# Patient Record
Sex: MALE | Race: WHITE | NOT HISPANIC OR LATINO | Employment: OTHER | URBAN - NONMETROPOLITAN AREA
[De-identification: names, ages, dates, MRNs, and addresses within clinical notes are randomized per-mention and may not be internally consistent; named-entity substitution may affect disease eponyms.]

---

## 2017-04-19 ENCOUNTER — OFFICE VISIT - GICH (OUTPATIENT)
Dept: FAMILY MEDICINE | Facility: OTHER | Age: 37
End: 2017-04-19

## 2017-04-19 DIAGNOSIS — Z00.00 ENCOUNTER FOR GENERAL ADULT MEDICAL EXAMINATION WITHOUT ABNORMAL FINDINGS: ICD-10-CM

## 2017-04-19 LAB
AMORPHOUS - HISTORICAL: PRESENT
BACTERIA URINE: ABNORMAL BACTERIA/HPF
BILIRUB UR QL: NEGATIVE
CLARITY, URINE: ABNORMAL CLARITY
COLOR UR: YELLOW COLOR
EPITHELIAL CELLS: ABNORMAL EPI/HPF
GLUCOSE URINE: NEGATIVE MG/DL
KETONES UR QL: NEGATIVE MG/DL
LEUKOCYTE ESTERASE URINE: NEGATIVE
NITRITE UR QL STRIP: NEGATIVE
OCCULT BLOOD,URINE - HISTORICAL: NEGATIVE
PH UR: 8.5 [PH]
PROTEIN QUALITATIVE,URINE - HISTORICAL: NEGATIVE MG/DL
RBC - HISTORICAL: ABNORMAL /HPF
SP GR UR STRIP: 1.02
UROBILINOGEN,QUALITATIVE - HISTORICAL: NORMAL EU/DL
WBC - HISTORICAL: ABNORMAL /HPF

## 2017-04-24 ENCOUNTER — OFFICE VISIT - GICH (OUTPATIENT)
Dept: FAMILY MEDICINE | Facility: OTHER | Age: 37
End: 2017-04-24

## 2017-04-24 ENCOUNTER — HISTORY (OUTPATIENT)
Dept: FAMILY MEDICINE | Facility: OTHER | Age: 37
End: 2017-04-24

## 2017-04-24 DIAGNOSIS — Z00.00 ENCOUNTER FOR GENERAL ADULT MEDICAL EXAMINATION WITHOUT ABNORMAL FINDINGS: ICD-10-CM

## 2017-04-24 DIAGNOSIS — F41.1 GENERALIZED ANXIETY DISORDER: ICD-10-CM

## 2017-04-24 LAB
A/G RATIO - HISTORICAL: 2.1 (ref 1–2)
ALBUMIN SERPL-MCNC: 4.5 G/DL (ref 3.5–5.7)
ALP SERPL-CCNC: 49 IU/L (ref 34–104)
ALT (SGPT) - HISTORICAL: 12 IU/L (ref 7–52)
ANION GAP - HISTORICAL: 6 (ref 5–18)
AST SERPL-CCNC: 15 IU/L (ref 13–39)
BILIRUB SERPL-MCNC: 0.5 MG/DL (ref 0.3–1)
BUN SERPL-MCNC: 12 MG/DL (ref 7–25)
BUN/CREAT RATIO - HISTORICAL: 15
CALCIUM SERPL-MCNC: 9.5 MG/DL (ref 8.6–10.3)
CHLORIDE SERPLBLD-SCNC: 104 MMOL/L (ref 98–107)
CHOL/HDL RATIO - HISTORICAL: 4.61
CHOLESTEROL TOTAL: 189 MG/DL
CO2 SERPL-SCNC: 27 MMOL/L (ref 21–31)
CREAT SERPL-MCNC: 0.81 MG/DL (ref 0.7–1.3)
GFR IF NOT AFRICAN AMERICAN - HISTORICAL: >60 ML/MIN/1.73M2
GLOBULIN - HISTORICAL: 2.1 G/DL (ref 2–3.7)
GLUCOSE SERPL-MCNC: 75 MG/DL (ref 70–105)
HDLC SERPL-MCNC: 41 MG/DL (ref 23–92)
LDLC SERPL CALC-MCNC: 106 MG/DL
NON-HDL CHOLESTEROL - HISTORICAL: 148 MG/DL
PATIENT STATUS - HISTORICAL: ABNORMAL
POTASSIUM SERPL-SCNC: 3.7 MMOL/L (ref 3.5–5.1)
PROT SERPL-MCNC: 6.6 G/DL (ref 6.4–8.9)
SODIUM SERPL-SCNC: 137 MMOL/L (ref 133–143)
TRIGL SERPL-MCNC: 212 MG/DL

## 2018-01-04 NOTE — NURSING NOTE
Patient Information     Patient Name MRN Sex Grace Hinds [id number] Male 1980      Nursing Note by Mattie Ambriz at 2017  1:30 PM     Author:  Mattie Ambriz Service:  (none) Author Type:  (none)     Filed:  2017  1:54 PM Encounter Date:  2017 Status:  Signed     :  Mattie Ambriz            Patient presents today for a regular physical.  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  Mattie Ambriz LPN  2017  1:41 PM

## 2018-01-04 NOTE — PROGRESS NOTES
Patient Information     Patient Name MRN Sex     Grace Ku [id number] Male 1980      Progress Notes by Abdullahi Wesley MD at 2017  1:30 PM     Author:  Abdullahi Wesley MD Service:  (none) Author Type:  Physician     Filed:  2017  2:19 PM Encounter Date:  2017 Status:  Signed     :  Abdullahi Wesley MD (Physician)            SUBJECTIVE:    Grace Ku is a 36 y.o. male who presents for a general physical exam.    HPI: Patient overall has been feeling well. He has a history of anxiety with panic and plans to establish with a provider from [facility] to manage his medications. He otherwise has no concerns today.    PROBLEM LIST:  There is no problem list on file for this patient.    PAST MEDICAL HISTORY:No past medical history on file.  SURGICAL HISTORY:  Past Surgical History:      Procedure  Laterality Date     WISDOM TEETH EXTRACTION  ~       SOCIAL HISTORY:  Social History     Social History        Marital status:  Single     Spouse name: N/A     Number of children:  N/A     Years of education:  N/A     Occupational History      Not on file.     Social History Main Topics       Smoking status: Former Smoker     Smokeless tobacco: Never Used     Alcohol use No     Drug use: No     Sexual activity: Not on file     Other Topics  Concern     Seat Belt Yes     Social History Narrative      in . No children. Does seasonal landscaping.     FAMILY HISTORY:  Family History      Problem  Relation Age of Onset     Good Health Mother      Good Health Father      Good Health Brother       CURRENT MEDICATIONS:   Current Outpatient Prescriptions       Medication  Sig Dispense Refill     LORazepam (ATIVAN) 0.5 mg tab Take 1 tablet by mouth 2 times daily if needed.       traZODone (DESYREL) 50 mg tablet Take 1 tablet by mouth at bedtime if needed.       venlafaxine (EFFEXOR XR) 75 mg cp24 Extended-Release capsule Take 2 capsules by mouth once daily with evening  "meal.       No current facility-administered medications for this visit.      Medications have been reviewed by me and are current to the best of my knowledge and ability.    ALLERGIES:  Mice [mouse protein]    REVIEW OF SYSTEMS:  General: denies any general problems.  Eyes: denies problems  Ears/Nose/Throat: denies problems  Cardiovascular: denies problems  Respiratory: denies problems  Gastrointestinal: denies problems  Genitourinary: denies problems  Musculoskeletal: denies problems  Skin: denies problems  Neurologic: denies problems  Psychiatric: denies problems  Endocrine: denies problems  Heme/Lymphatic: denies problems  Allergic/Immunologic: denies problems  PHQ Depression Screening 4/24/2017   Date of PHQ exam (doc flow) 4/24/2017   1. Lack of interest/pleasure 0 - Not at all   2. Feeling down/depressed 0 - Not at all   PHQ-2 TOTAL SCORE 0      OBJECTIVE:  /76  Pulse 92  Ht 1.765 m (5' 9.5\")  Wt 67.3 kg (148 lb 6 oz)  BMI 21.6 kg/m2  EXAM:   General Appearance: Pleasant, alert, appropriate appearance for age. No acute distress  Head Exam: Normal. Normocephalic, atraumatic.  Eye Exam:  Normal external eye, conjunctiva, lids, cornea. JOANNA.  Ear Exam: Normal TM's bilaterally. Normal auditory canals and external ears. Non-tender.  Nose Exam: Normal external nose, mucus membranes, and septum.  OroPharynx Exam:  Dental hygiene adequate. Normal buccal mucosa. Normal pharynx.  Neck Exam:  Supple, no masses or nodes.  Thyroid Exam: No nodules or enlargement.  Chest/Respiratory Exam: Normal chest wall and respirations. Clear to auscultation.  Cardiovascular Exam: Regular rate and rhythm. S1, S2, no murmur, click, gallop, or rubs.  Gastrointestinal Exam: Soft, non-tender, no masses or organomegaly.  Genitourinary Exam Male: Normal male genitalia. No discharge or penile ulcerations. No testicular masses or swelling.  Lymphatic Exam: Non-palpable nodes in neck, clavicular, axillary, or inguinal " regions.  Musculoskeletal Exam: Back is straight and non-tender, full ROM of upper and lower extremities.  Foot Exam: Normal.  Skin: no rash or abnormalities  Neurologic Exam: Nonfocal, symmetric DTRs, normal gross motor, tone coordination and no tremor.  Psychiatric Exam: Alert and oriented - appropriate affect.    ASSESSMENT/PLAN:    ICD-10-CM    1. Health care maintenance  Immunizations reviewed. He will be due for a tetanus booster next year.  Z00.00 COMPLETE METABOLIC PANEL      LIPID PANEL      COMPLETE METABOLIC PANEL      LIPID PANEL   2. Generalized anxiety disorder  He plans to establish with [facility] for medication management in this regard.  F41.1        Abdullahi Wesley MD

## 2018-01-04 NOTE — NURSING NOTE
Patient Information     Patient Name MRN Sex Grcae Hinds [id number] Male 1980      Nursing Note by Ashli Santos at 2017  2:45 PM     Author:  Ashli Santos Service:  (none) Author Type:  (none)     Filed:  2017  3:13 PM Encounter Date:  2017 Status:  Signed     :  Ashli Santos            Patient here for DOT physical, Visual Acuity Screening - Snellen Chart   Visual acuity OD (right eye): 20/ 20   Visual acuity OS (left eye): 20/ 20   Visual acuity OU (both eyes): 20/ 20   Corrective lenses worn: No  Color screening passed. .sing

## 2018-01-04 NOTE — PROGRESS NOTES
"Patient Information     Patient Name MRN Sex     Grace Ku [id number] Male 1980      Progress Notes by Tate Subramanian MD at 2017  2:45 PM     Author:  Tate Subramanian MD Service:  (none) Author Type:  Physician     Filed:  2017 10:57 AM Encounter Date:  2017 Status:  Signed     :  Tate Subramanian MD (Physician)            SUBJECTIVE:    Grace Ku is a 36 y.o. male who presents for DOT    HPI Comments: Patient arrives here for a DOT physical. Currently does not offer any complaints or concerns.      Allergies     Allergen  Reactions     Mice [Mouse Protein] Hives   , No family history on file.,   No current outpatient prescriptions on file prior to visit.     No current facility-administered medications on file prior to visit.    , No past surgical history on file.,   Social History     Substance Use Topics       Smoking status: Not on file     Smokeless tobacco: Not on file     Alcohol use Not on file    and   Social History     Substance Use Topics       Smoking status: Not on file     Smokeless tobacco: Not on file     Alcohol use Not on file       REVIEW OF SYSTEMS:  ROS    OBJECTIVE:  /74  Pulse 68  Ht 1.759 m (5' 9.25\")  Wt 67 kg (147 lb 12.8 oz)  BMI 21.67 kg/m2    EXAM:   Physical Exam   Constitutional: He is oriented to person, place, and time and well-developed, well-nourished, and in no distress.   HENT:   Head: Normocephalic.   Right Ear: External ear normal.   Left Ear: External ear normal.   Mouth/Throat: No oropharyngeal exudate.   Eyes: Pupils are equal, round, and reactive to light.   Neck: Normal range of motion.   Cardiovascular: Normal rate, regular rhythm and normal heart sounds.    Pulmonary/Chest: Effort normal and breath sounds normal. No respiratory distress. He has no wheezes. He has no rales.   Abdominal: Soft.   Musculoskeletal: Normal range of motion.   Neurological: He is alert and oriented to person, place, and time. "   Psychiatric: Affect normal.     Results for orders placed or performed in visit on 04/19/17       URINALYSIS W REFLEX MICROSCOPIC IF POSITIVE       Result  Value Ref Range Status    COLOR                     Yellow Yellow Color Final    CLARITY                   Slightly Cloudy (A) Clear Clarity Final    SPECIFIC GRAVITY,URINE    1.020 1.010, 1.015, 1.020, 1.025                 Final    PH,URINE                  8.5 6.0, 7.0, 8.0, 5.5, 6.5, 7.5, 8.5                 Final    UROBILINOGEN,QUALITATIVE  Normal Normal EU/dl Final    PROTEIN, URINE Negative Negative mg/dL Final    GLUCOSE, URINE Negative Negative mg/dL Final    KETONES,URINE             Negative Negative mg/dL Final    BILIRUBIN,URINE           Negative Negative                 Final    OCCULT BLOOD,URINE        Negative Negative                 Final    NITRITE                   Negative Negative                 Final    LEUKOCYTE ESTERASE        Negative Negative                 Final   URINALYSIS MICROSCOPIC       Result  Value Ref Range Status    RBC None Seen 0-2, None Seen /HPF Final    WBC None Seen 0-2, 3-5, None Seen /HPF Final    BACTERIA                  Few None Seen, Rare, Occasional, Few Bacteria/HPF Final    EPITHELIAL CELLS          None Seen None Seen, Few Epi/HPF Final    AMORPHOUS                 Present (A) (none)                 Final       ASSESSMENT/PLAN:    ICD-10-CM    1. Preventative health care Z00.00 URINALYSIS W REFLEX MICROSCOPIC IF POSITIVE      URINALYSIS W REFLEX MICROSCOPIC IF POSITIVE      URINALYSIS MICROSCOPIC      URINALYSIS MICROSCOPIC        Plan:  Satisfactory DOT. Patient is given a 2 year certificate.

## 2018-01-25 VITALS
WEIGHT: 148.38 LBS | HEIGHT: 70 IN | DIASTOLIC BLOOD PRESSURE: 76 MMHG | SYSTOLIC BLOOD PRESSURE: 132 MMHG | BODY MASS INDEX: 21.24 KG/M2 | HEART RATE: 92 BPM

## 2018-01-25 VITALS
BODY MASS INDEX: 21.89 KG/M2 | HEIGHT: 69 IN | DIASTOLIC BLOOD PRESSURE: 74 MMHG | SYSTOLIC BLOOD PRESSURE: 132 MMHG | WEIGHT: 147.8 LBS | HEART RATE: 68 BPM

## 2018-01-31 ENCOUNTER — DOCUMENTATION ONLY (OUTPATIENT)
Dept: FAMILY MEDICINE | Facility: OTHER | Age: 38
End: 2018-01-31

## 2018-01-31 PROBLEM — F41.1 GENERALIZED ANXIETY DISORDER: Status: ACTIVE | Noted: 2017-04-24

## 2018-01-31 RX ORDER — LORAZEPAM 0.5 MG/1
1 TABLET ORAL 2 TIMES DAILY PRN
COMMUNITY
Start: 2017-03-02 | End: 2019-01-30

## 2018-01-31 RX ORDER — VENLAFAXINE HYDROCHLORIDE 75 MG/1
2 CAPSULE, EXTENDED RELEASE ORAL
COMMUNITY
Start: 2017-03-02 | End: 2019-01-30

## 2018-01-31 RX ORDER — TRAZODONE HYDROCHLORIDE 50 MG/1
1 TABLET, FILM COATED ORAL
COMMUNITY
Start: 2017-03-02 | End: 2019-01-30

## 2019-01-30 ENCOUNTER — OFFICE VISIT (OUTPATIENT)
Dept: FAMILY MEDICINE | Facility: OTHER | Age: 39
End: 2019-01-30
Attending: FAMILY MEDICINE
Payer: COMMERCIAL

## 2019-01-30 VITALS
BODY MASS INDEX: 21.72 KG/M2 | WEIGHT: 149.2 LBS | TEMPERATURE: 98.4 F | SYSTOLIC BLOOD PRESSURE: 128 MMHG | HEART RATE: 80 BPM | DIASTOLIC BLOOD PRESSURE: 78 MMHG | RESPIRATION RATE: 12 BRPM

## 2019-01-30 DIAGNOSIS — K52.9 GASTROENTERITIS: Primary | ICD-10-CM

## 2019-01-30 PROCEDURE — G0463 HOSPITAL OUTPT CLINIC VISIT: HCPCS

## 2019-01-30 PROCEDURE — 99213 OFFICE O/P EST LOW 20 MIN: CPT | Performed by: FAMILY MEDICINE

## 2019-01-30 ASSESSMENT — ANXIETY QUESTIONNAIRES
7. FEELING AFRAID AS IF SOMETHING AWFUL MIGHT HAPPEN: NOT AT ALL
1. FEELING NERVOUS, ANXIOUS, OR ON EDGE: NOT AT ALL
5. BEING SO RESTLESS THAT IT IS HARD TO SIT STILL: NOT AT ALL
GAD7 TOTAL SCORE: 0
3. WORRYING TOO MUCH ABOUT DIFFERENT THINGS: NOT AT ALL
6. BECOMING EASILY ANNOYED OR IRRITABLE: NOT AT ALL
2. NOT BEING ABLE TO STOP OR CONTROL WORRYING: NOT AT ALL

## 2019-01-30 ASSESSMENT — PATIENT HEALTH QUESTIONNAIRE - PHQ9
SUM OF ALL RESPONSES TO PHQ QUESTIONS 1-9: 0
5. POOR APPETITE OR OVEREATING: NOT AT ALL

## 2019-01-30 ASSESSMENT — PAIN SCALES - GENERAL: PAINLEVEL: NO PAIN (0)

## 2019-01-30 NOTE — NURSING NOTE
Patient here abdominal discomfort with diarrhea for the past 3 weeks. Possible tapeworm, has noticed a noodle like object in his stool. Medication Reconciliation: complete.    Ashli Santos LPN  1/30/2019 8:15 AM

## 2019-01-31 ASSESSMENT — ANXIETY QUESTIONNAIRES: GAD7 TOTAL SCORE: 0

## 2019-01-31 NOTE — PROGRESS NOTES
"  SUBJECTIVE:   Grace Ku is a 38 year old male who presents to clinic today for the following health issues: Tapeworm concern    Patient arrives here concerned about a tapeworm.  States he eats quite a bit of fish and seafood that is not not collected but marinated in [employer].  States recently he had noodles.  Shortly after having noodles he had diarrhea and noticed a possible worm present.  He has had rostrum.  [place].  Denies any weight loss no bloating.  Occurred about 3 weeks ago.  He noticed the \"worm\" only once          Patient Active Problem List    Diagnosis Date Noted     Generalized anxiety disorder 04/24/2017     Priority: Medium     No past medical history on file.   Past Surgical History:   Procedure Laterality Date     EXTRACTION(S) DENTAL      ~1997     Allergies   Allergen Reactions     Mouse Protein Hives       Review of Systems     OBJECTIVE:     /78 (BP Location: Right arm, Patient Position: Sitting)   Pulse 80   Temp 98.4  F (36.9  C)   Resp 12   Wt 67.7 kg (149 lb 3.2 oz)   BMI 21.72 kg/m    Body mass index is 21.72 kg/m .  Physical Exam   Constitutional: He appears well-developed.   HENT:   Head: Normocephalic.   Abdominal: Soft. Bowel sounds are normal. He exhibits no distension and no mass. There is no tenderness.       none     ASSESSMENT/PLAN:         1. Gastroenteritis  I think it is remotely possible that the patient does have a warm.  It sounds like that this might be more of a noodle because of his gastroenteritis.  He is given cups to bring in a sample if he should notice a \"worm\".        Tate Subramanian MD  [facility]  "

## 2019-08-13 LAB
O+P STL MICRO: ABNORMAL
O+P STL MICRO: ABNORMAL
SPECIMEN SOURCE: ABNORMAL

## 2019-08-16 ENCOUNTER — OFFICE VISIT (OUTPATIENT)
Dept: FAMILY MEDICINE | Facility: OTHER | Age: 39
End: 2019-08-16
Attending: FAMILY MEDICINE
Payer: COMMERCIAL

## 2019-08-16 ENCOUNTER — TELEPHONE (OUTPATIENT)
Dept: FAMILY MEDICINE | Facility: OTHER | Age: 39
End: 2019-08-16

## 2019-08-16 VITALS
TEMPERATURE: 98.9 F | BODY MASS INDEX: 21.39 KG/M2 | RESPIRATION RATE: 16 BRPM | WEIGHT: 149.4 LBS | SYSTOLIC BLOOD PRESSURE: 120 MMHG | DIASTOLIC BLOOD PRESSURE: 64 MMHG | HEIGHT: 70 IN | HEART RATE: 68 BPM

## 2019-08-16 DIAGNOSIS — B71.9 TAPEWORM INFECTION: Primary | ICD-10-CM

## 2019-08-16 DIAGNOSIS — K52.9 GASTROENTERITIS: ICD-10-CM

## 2019-08-16 PROCEDURE — 99213 OFFICE O/P EST LOW 20 MIN: CPT | Performed by: FAMILY MEDICINE

## 2019-08-16 PROCEDURE — G0463 HOSPITAL OUTPT CLINIC VISIT: HCPCS

## 2019-08-16 RX ORDER — PRAZIQUANTEL 600 MG/1
TABLET, FILM COATED ORAL
Qty: 2 TABLET | Refills: 0 | Status: SHIPPED | OUTPATIENT
Start: 2019-08-16 | End: 2020-07-14

## 2019-08-16 ASSESSMENT — PAIN SCALES - GENERAL: PAINLEVEL: NO PAIN (0)

## 2019-08-16 ASSESSMENT — PATIENT HEALTH QUESTIONNAIRE - PHQ9: SUM OF ALL RESPONSES TO PHQ QUESTIONS 1-9: 0

## 2019-08-16 ASSESSMENT — MIFFLIN-ST. JEOR: SCORE: 1603.92

## 2019-08-16 NOTE — TELEPHONE ENCOUNTER
Prescription not covered unable to pay cash because patient is on [employer]. Prior authorization needs to be done or change what is on Formulary pharmacist had no idea what is on formulary. Will check with CLAUDIA as MBL is out this afternoon. Ashli Santos LPN .......................8/16/2019  2:02 PM

## 2019-08-16 NOTE — PROGRESS NOTES
"  SUBJECTIVE:   Grace Ku is a 38 year old male who presents to clinic today for the following health issues: Follow-up    Patient was seen back in the early part of the year concerns about a tapeworm.  He was given sample cups to bring in if he is concerned about another tapeworm.  He did bring in a couple days ago.  And was found to have tapeworm eggs.  He arrives here for follow-up.  Further history he does use Lyme to clean raw fish.  And he will eat them.  He remembers one specific incident where the line may be did not completely immerse the fish.        Patient Active Problem List    Diagnosis Date Noted     Tapeworm infection 08/16/2019     Priority: Medium     Generalized anxiety disorder 04/24/2017     Priority: Medium     No past medical history on file.   Past Surgical History:   Procedure Laterality Date     EXTRACTION(S) DENTAL      ~1997     Family History   Problem Relation Age of Onset     Family History Negative Mother         Good Health     Family History Negative Father         Good Health     Family History Negative Brother         Good Health       Review of Systems     OBJECTIVE:     /64   Pulse 68   Temp 98.9  F (37.2  C)   Resp 16   Ht 1.778 m (5' 10\")   Wt 67.8 kg (149 lb 6.4 oz)   BMI 21.44 kg/m    Body mass index is 21.44 kg/m .  Physical Exam   Constitutional: He appears well-developed.   Eyes: Pupils are equal, round, and reactive to light.   Neurological: He is alert.   Skin: Skin is warm.       Diagnostic Test Results:  Results for orders placed or performed in visit on 08/16/19   Ova and Parasite Exam Routine   Result Value Ref Range    Specimen Description Feces     Ova and Parasite Exam Diphyllobothrium latum eggs (A)     Ova and Parasite Exam       Cryptosporidium, Cyclospora, and Microsporidia are not readily detected by this method. A   single negative specimen does not rule out parasitic infection.         ASSESSMENT/PLAN:           2. Tapeworm infection  1 " dose.  He is given a cup to repeat in 1 month.  - praziquantel (BILTRICIDE) 600 MG tablet; Take 2 tablets as a single dose  Dispense: 2 tablet; Refill: 0      Tate Subramanian MD  [facility]

## 2019-08-16 NOTE — TELEPHONE ENCOUNTER
Will need to do prior authorization. Cannot find alternative.  Thanks,  Raeann Diaz, CNP on 8/16/2019 at 2:13 PM

## 2019-08-16 NOTE — TELEPHONE ENCOUNTER
Prior authorization completed and martha, patient notified he can  prescription at [facility]. Ashli Santos LPN .......................8/16/2019  2:47 PM

## 2019-08-16 NOTE — TELEPHONE ENCOUNTER
Call placed to Karen for a prior authorization, she will return call if covered. Ashli Santos LPN .......................8/16/2019  2:23 PM

## 2020-01-06 ENCOUNTER — TELEPHONE (OUTPATIENT)
Dept: FAMILY MEDICINE | Facility: OTHER | Age: 40
End: 2020-01-06

## 2020-01-06 NOTE — TELEPHONE ENCOUNTER
Lab order is in and patient will bring in samples and wait for results.  Patient aware.  Norma J. Gosselin, LPN .......  1/6/2020  11:26 AM

## 2020-01-07 DIAGNOSIS — B71.9 TAPEWORM INFECTION: ICD-10-CM

## 2020-01-30 ENCOUNTER — TELEPHONE (OUTPATIENT)
Dept: FAMILY MEDICINE | Facility: OTHER | Age: 40
End: 2020-01-30

## 2020-01-30 DIAGNOSIS — B71.9 TAPEWORM INFECTION: Primary | ICD-10-CM

## 2020-01-30 NOTE — TELEPHONE ENCOUNTER
Reason for call: Request for results.    Name of test or procedure: Tape Worm Test    Date of test or procedure: 01/06/2020    Location of test or procedure: [facility]    Preferred method for responding to this message: Telephone Call    Phone number patient can be reached at: Cell number on file:    Telephone Information:   Mobile [phone]       If we can't reach you directly, may we leave a detailed response at the number you provided?NA

## 2020-02-03 NOTE — TELEPHONE ENCOUNTER
What are the results of the Ova and Parasite test?  Lopez Ibarra LPN,..............2/3/2020 9:02 AM

## 2020-02-03 NOTE — TELEPHONE ENCOUNTER
After verifying pts name and date of birth with pt,pt notified of message below and states [place] and pt notified [place] until June. Pt notified he should not wait until June to have the test done and should follow up with someone in [place].  Daksha Carrillo LPN

## 2020-07-08 ENCOUNTER — TELEPHONE (OUTPATIENT)
Dept: FAMILY MEDICINE | Facility: OTHER | Age: 40
End: 2020-07-08

## 2020-07-08 NOTE — TELEPHONE ENCOUNTER
GUERLINE-pt back from [facility] and would like to set up testing for tapeworm. Please call. Thank you.  Nikki Christensen

## 2020-07-08 NOTE — TELEPHONE ENCOUNTER
Spoke with patient he would like to drop off sample for tapeworm. The order is in and he has specimen cup and will bring it in soon.Offered appointment to see Dr. Subramanian but he feels its not necessary again.  Ashli Santos LPN .......................7/8/2020  4:06 PM

## 2020-07-13 DIAGNOSIS — B71.9 TAPEWORM INFECTION: ICD-10-CM

## 2020-07-13 PROCEDURE — 87209 SMEAR COMPLEX STAIN: CPT | Mod: ZL | Performed by: FAMILY MEDICINE

## 2020-07-13 PROCEDURE — 87177 OVA AND PARASITES SMEARS: CPT | Mod: ZL | Performed by: FAMILY MEDICINE

## 2020-07-14 DIAGNOSIS — B71.9 TAPEWORM INFECTION: Primary | ICD-10-CM

## 2020-07-14 LAB
O+P STL MICRO: ABNORMAL
O+P STL MICRO: ABNORMAL
SPECIMEN SOURCE: ABNORMAL

## 2020-07-14 RX ORDER — PRAZIQUANTEL 600 MG/1
TABLET, FILM COATED ORAL
Qty: 2 TABLET | Refills: 0 | Status: SHIPPED | OUTPATIENT
Start: 2020-07-14 | End: 2022-06-03

## 2020-07-15 ENCOUNTER — TELEPHONE (OUTPATIENT)
Dept: FAMILY MEDICINE | Facility: OTHER | Age: 40
End: 2020-07-15

## 2020-07-15 DIAGNOSIS — B71.9 TAPEWORM INFECTION: Primary | ICD-10-CM

## 2020-09-25 NOTE — TELEPHONE ENCOUNTER
Patient received the medication. Should he have a follow up test for tapeworm to make sure he has gotten rid of it?  Lab order teed-up.    Norma J. Gosselin, LPN .......  9/25/2020  8:48 AM

## 2022-06-03 ENCOUNTER — OFFICE VISIT (OUTPATIENT)
Dept: FAMILY MEDICINE | Facility: OTHER | Age: 42
End: 2022-06-03
Attending: NURSE PRACTITIONER
Payer: COMMERCIAL

## 2022-06-03 ENCOUNTER — NURSE TRIAGE (OUTPATIENT)
Dept: FAMILY MEDICINE | Facility: OTHER | Age: 42
End: 2022-06-03
Payer: COMMERCIAL

## 2022-06-03 VITALS
SYSTOLIC BLOOD PRESSURE: 127 MMHG | BODY MASS INDEX: 22.33 KG/M2 | RESPIRATION RATE: 18 BRPM | HEART RATE: 76 BPM | WEIGHT: 156 LBS | OXYGEN SATURATION: 96 % | TEMPERATURE: 98 F | DIASTOLIC BLOOD PRESSURE: 85 MMHG | HEIGHT: 70 IN

## 2022-06-03 DIAGNOSIS — S70.362A TICK BITE OF LEFT THIGH, INITIAL ENCOUNTER: Primary | ICD-10-CM
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
## 2022-06-03 PROCEDURE — G0463 HOSPITAL OUTPT CLINIC VISIT: HCPCS

## 2022-06-03 PROCEDURE — 99213 OFFICE O/P EST LOW 20 MIN: CPT | Performed by: NURSE PRACTITIONER

## 2022-06-03 RX ORDER — DOXYCYCLINE 100 MG/1
200 CAPSULE ORAL ONCE
Qty: 2 CAPSULE | Refills: 0 | Status: SHIPPED | OUTPATIENT
Start: 2022-06-03 | End: 2022-06-03

## 2022-06-03 ASSESSMENT — PAIN SCALES - GENERAL: PAINLEVEL: MODERATE PAIN (5)

## 2022-06-03 NOTE — PATIENT INSTRUCTIONS
Prevention of tick bites including daily tick checks, use of DEET or permethrin on boots and lower pant legs, tucking pants into socks, etc    Anaplasmosis symptoms:  Usually occur around 5 to 14 days after a tick bite  Symptoms include: flu like illness with fever, chills, vomiting, headache, body aches, and fatigue.    Lyme disease symptoms:  Usually occur after 10 days from tick bite  Symptoms include:  joint pain, body aches, headaches, fatigue, bulls eye lesion at site or any where on the body, multiple red patches on skin, etc

## 2022-06-03 NOTE — PROGRESS NOTES
ASSESSMENT/PLAN:     I have reviewed the nursing notes.  I have reviewed the findings, diagnosis, plan and need for follow up with the patient.      1. Tick bite of left thigh, initial encounter    - doxycycline hyclate (VIBRAMYCIN) 100 MG capsule; Take 2 capsules (200 mg) by mouth once for 1 dose  Dispense: 2 capsule; Refill: 0      Doxycycline 200 mg one time for prophylactic dosing  No lab work needed today as it is too soon for accurate results   Instructed to wash tick bite with soap and water. Apply antibiotic ointment to site 1-2 times daily until healed.  Watch for flu like illness such as fevers and chills; arthritis type pain such as joint pains and stiffness.   Follow up if development of any of these symptoms for further evaluation.       I explained my diagnostic considerations and recommendations to the patient, who voiced understanding and agreement with the treatment plan. All questions were answered. We discussed potential side effects of any prescribed or recommended therapies, as well as expectations for response to treatments.    Ginny Joyner NP  [facility]      SUBJECTIVE:   Grace Ku is a 41 year old male who presents to clinic today for the following health issues:  Tick bite    HPI  He found an attached deer tick yesterday morning on his left thigh.  States he thinks he got it the day prior as he out walking [place] but he is outside daily.  Denies any current symptoms such as fevers, chills, headaches, body aches, joint aches, fatigue or rash.  Denies any hx of previous tick illness.          No past medical history on file.  Past Surgical History:   Procedure Laterality Date     EXTRACTION(S) DENTAL      ~1997     Social History     Tobacco Use     Smoking status: Former Smoker     Smokeless tobacco: Never Used   Substance Use Topics     Alcohol use: Yes     Comment: 8 a week      Current Outpatient Medications   Medication Sig Dispense Refill  "    praziquantel (BILTRICIDE) 600 MG tablet Take 2 tablets as a single dose 2 tablet 0     Allergies   Allergen Reactions     Cats      Mouse Protein Hives         Past medical history, past surgical history, current medications and allergies reviewed and accurate to the best of my knowledge.        OBJECTIVE:     /85 (BP Location: Right arm, Patient Position: Sitting, Cuff Size: Adult Large)   Pulse 76   Temp 98  F (36.7  C) (Tympanic)   Resp 18   Ht 1.778 m (5' 10\")   Wt 70.8 kg (156 lb)   SpO2 96%   BMI 22.38 kg/m    Body mass index is 22.38 kg/m .     Physical Exam  General Appearance: Well appearing adult male, appropriate appearance for age. No acute distress  Respiratory: normal chest wall and respirations.  Normal effort.  No cough appreciated.    Musculoskeletal:  Equal movement of bilateral upper extremities.  Equal movement of bilateral lower extremities.  Normal gait.    Dermatological:  Left mid medial thigh with single tick bite with dark center and small erythematous [place], no surrounding erythema or bulls eye lesion.  Psychological: normal affect, alert, oriented, and pleasant.       "

## 2022-06-03 NOTE — NURSING NOTE
Pt presents to clinic today for a deer tick bite that the patient noticed yesterday.       FOOD SECURITY SCREENING QUESTIONS:    The next two questions are to help us understand your food security.  If you are feeling you need any assistance in this area, we have resources available to support you today.    Hunger Vital Signs:  Within the past 12 months we worried whether our food would run out before we got money to buy more. Never  Within the past 12 months the food we bought just didn't last and we didn't have money to get more. Never            Medication Reconciliation: complete  Kendra Ash LPN,LPN on 6/3/2022 at 10:45 AM

## 2022-06-03 NOTE — TELEPHONE ENCOUNTER
S-(situation): Patient call with concern of tick bite    B-(background): Dr. Subramanian Patient, no history of lyme disease    A-(assessment): Patient states known deer tick, very small in size. Bite location of left inner thigh. Patient does not know exact attachment timeline, imagined was on overnight. Removed 06/02/2022 morning. Site has dark geno in center and redness surrounding about a quarter inch according to patient. Denies fever, extending rash, weakness, muscle aches or headache.     R-(recommendations): Patient desires prophylactic antibiotic. Discussed triage protocol in great extent. Advised patient to seek [facility] care on this date. Discussed Dr. Subramanian is currently out of office as well as no clinic appointments available. Advised home care for tick bite including treatment of site and monitoring for further symptoms. Patient verbalized understanding, no questions at this time.       Reason for Disposition    Patient wants to be seen    Tick bite with no complications    Additional Information    Negative: Fever or severe headache occurs, 2 to 14 days following the bite    Negative: Widespread rash occurs, 2 to 14 days following the bite    Negative: Can't remove live tick (after using Care Advice)    Negative: Can't remove tick's head that was broken off in the skin (after using Care Advice)    Negative: Fever and area is red    Negative: Fever and area is very tender to touch    Negative: Red streak or red line and length > 2 inches (5 cm)    Negative: Red ring or bull's-eye rash occurs around a deer tick bite    Negative: Probable deer tick that was attached > 36 hours (or tick appears swollen, not flat)    Protocols used: TICK BITE-A-OH    Briseida Crawford RN ....................  6/3/2022   9:04 AM

## 2022-06-03 NOTE — TELEPHONE ENCOUNTER
Please call the patient.  He took out a tick on his thigh.  He has a dark geno now.      Mell Tamez on 6/3/2022 at 8:18 AM

## 2022-09-17 ENCOUNTER — HEALTH MAINTENANCE LETTER (OUTPATIENT)
Age: 42
End: 2022-09-17

## 2023-10-08 ENCOUNTER — HEALTH MAINTENANCE LETTER (OUTPATIENT)
Age: 43
End: 2023-10-08

## 2024-11-30 ENCOUNTER — HEALTH MAINTENANCE LETTER (OUTPATIENT)
Age: 44
End: 2024-11-30